# Patient Record
(demographics unavailable — no encounter records)

---

## 2024-12-20 NOTE — REVIEW OF SYSTEMS
[Constipation: Grade 0] : Constipation: Grade 0 [Diarrhea: Grade 0] : Diarrhea: Grade 0 [Rectal Pain: Grade 0] : Rectal Pain: Grade 0 [Hematuria: Grade 0] : Hematuria: Grade 0 [Urinary Incontinence: Grade 1 - Occasional (e.g., with coughing, sneezing, etc.), pads not indicated] : Urinary Incontinence: Grade 1 - Occasional (e.g., with coughing, sneezing, etc.), pads not indicated [Urinary Retention: Grade 1 - Urinary, suprapubic or intermittent catheter placement not indicated; able to void with some residual] : Urinary Retention: Grade 1 - Urinary, suprapubic or intermittent catheter placement not indicated; able to void with some residual [Urinary Tract Pain: Grade 0] : Urinary Tract Pain: Grade 0 [Urinary Urgency: Grade 1 - Present] : Urinary Urgency: Grade 1 - Present [Urinary Frequency: Grade 1 - Present] : Urinary Frequency: Grade 1 - Present [Erectile Dysfunction: Grade 1 - Decrease in erectile function (frequency or rigidity of erections) but intervention not indicated (e.g., medication or use of mechanical device, penile pump)] : Erectile Dysfunction: Grade 1 - Decrease in erectile function (frequency or rigidity of erections) but intervention not indicated (e.g., medication or use of mechanical device, penile pump) [Urinary Retention: Grade 0] : Urinary Retention: Grade 0 [Erectile Dysfunction: Grade 2 - Decrease in erectile function (frequency/rigidity of erections), erectile intervention indicated, (e.g., medication or mechanical devices such as penile pump)] : Erectile Dysfunction: Grade 2 - Decrease in erectile function (frequency/rigidity of erections), erectile intervention indicated, (e.g., medication or mechanical devices such as penile pump)

## 2024-12-20 NOTE — PHYSICAL EXAM
[Normal] : well developed, well nourished, in no acute distress [] : no respiratory distress [Respiration, Rhythm And Depth] : normal respiratory rhythm and effort [No Focal Deficits] : no focal deficits [Oriented To Time, Place, And Person] : oriented to person, place, and time [Nondistended] : nondistended

## 2024-12-22 NOTE — HISTORY OF PRESENT ILLNESS
[FreeTextEntry1] : Mr. Carrillo is a 72-year-old male with cT1c G7(4+3) iPSA 4.41 (1/3/22) unfavorable intermediate risk prostate cancer.  Decipher 0.91. ADT started 5/17/22 with 6 months total planned. Baseline IPSS score: 30. EPIC-CP score: 17; median lobectomy performed 6/24/22 (6 cc benign tissue). The patient underwent a course of radiation therapy 7,000 cGy in 28 fx from 9/12/2022 to 10/19/2022.   PTE - 12/01/2022 Notes weak stream, incomplete emptying, nocturia x 3 and urgency since decreasing doxazosin to 2 mg daily.   . Hot flashes bothersome, black cohosh did not work.  Denies dysuria, hematuria or bowel symptoms. Taking doxazosin 1 tab at night and solifenacin. Not sexually active as erections only suitable for masturbation. Tried Viagra and Cialis in the past without success.  Will discuss other treatment options with Dr. Azar.  IPSS/QOL/EPIC 23/5/18   Followup - 03/10/2023 03/01/2023 PSA -0.03 ng/ml. He continues to take Doxazosin (2mg BID) & Vesicare. Reports persistent hesitancy, nocturia x 2-3, urgency, occasional dribbling.  EPCIC/QOL/IPSS- 32/5/29.  Followup 6/20/23:  Patient continues with a weak stream, urinary frequency and nocturia, dribbling continues, uses one pad per day. denies hematuria, pain with urination.  Notes that his urinary symptoms are overall better and manageable.  Continued concerns with sexual function. Gynecomastia, but no breast tenderness, and hot flashes continue. Weight has returned to baseline.  Taking doxazosin 4 mg BID, continues with Solifenacin 5mg daily.  EPCIC/QOL/IPSS -  29/4/21  Follow up 12/15/23: Patient continues to have complaints in regard to LUTS. He states that he has started training his bladder at home which has helped to control the frequency a bit, however he continues to struggle with incomplete emptying, weak stream, urgency and associated leakage if he is unable to make it to the restroom in time. Presses on his abdomen to help with emptying.  Continues AZO, doxazosin 4 mg BID, and Solifenacin 5mg daily. The patient also complains of ED, mentioning that he is able to get an erection but is unable to maintain for a meaningful period of time. He expressed a desire for medication to aid in this issue. No bowel issues.  IPSS/QOL/EPIC score- 23/5/27. Testosterone is recovered and PSA remains low, excellent response. Persistent obstructive symptoms: continue doxazosin and will add tadalafil. This will be a persistent issue as he had significant obstructive issues at baseline requiring a median lobectomy; function is still better than pre-treatment at least. Will recheck PSA again in 6 months and follow up; continue to follow with Dr. Azar as well (seeing on 12/19/23).   6/21/24 - presents for follow up visit.  He continues to experience incomplete bladder emptying with weak stream and interruption of stream.  He no longer using tamsulosin due to lack of improvement as well as making him dizzy.  He in in the midst of looking for a new urologist (Dr Soni at [facility] [place] awaiting appt referred to him by Dr Brantley at [facility]) IPSS/QOL/EPIC score: 27/5/30  PSA 6/3/24 0.16  PSA trend:  iPSA 4.41 (1/3/22), baseline testosterone 306 on 12/10/21 -> ADT started 5/17/22 (6 mo) 0.39 9/12/22 -> RT complete 10/19/22  0.04 12/20/22, testosterone <2.5 0.03 3/1/22, testosterone 39.7  0.11 6/12/23, testosterone 295 0.239/12/23, testosterone 323  0.16 12/7/23, testosterone 342  0.18 3/12/24  0.16 6/3/24 0.11 12/16/24, Testosterone 486  6/2024: Bladder neck sx at [facility] with Dr. Tr Wick (per patient)  12/20/2024: Follow up Reports much improvement in urinary symptoms post sx. ED not responsive to Tadalafil. Now following uro Dr. Brantley( [place]) IPSS/QOL/EPIC score:11/5/18

## 2024-12-22 NOTE — HISTORY OF PRESENT ILLNESS
[FreeTextEntry1] : Mr. Carrillo is a 72-year-old male with cT1c G7(4+3) iPSA 4.41 (1/3/22) unfavorable intermediate risk prostate cancer.  Decipher 0.91. ADT started 5/17/22 with 6 months total planned. Baseline IPSS score: 30. EPIC-CP score: 17; median lobectomy performed 6/24/22 (6 cc benign tissue). The patient underwent a course of radiation therapy 7,000 cGy in 28 fx from 9/12/2022 to 10/19/2022.   PTE - 12/01/2022 Notes weak stream, incomplete emptying, nocturia x 3 and urgency since decreasing doxazosin to 2 mg daily.   . Hot flashes bothersome, black cohosh did not work.  Denies dysuria, hematuria or bowel symptoms. Taking doxazosin 1 tab at night and solifenacin. Not sexually active as erections only suitable for masturbation. Tried Viagra and Cialis in the past without success.  Will discuss other treatment options with Dr. Azar.  IPSS/QOL/EPIC 23/5/18   Followup - 03/10/2023 03/01/2023 PSA -0.03 ng/ml. He continues to take Doxazosin (2mg BID) & Vesicare. Reports persistent hesitancy, nocturia x 2-3, urgency, occasional dribbling.  EPCIC/QOL/IPSS- 32/5/29.  Followup 6/20/23:  Patient continues with a weak stream, urinary frequency and nocturia, dribbling continues, uses one pad per day. denies hematuria, pain with urination.  Notes that his urinary symptoms are overall better and manageable.  Continued concerns with sexual function. Gynecomastia, but no breast tenderness, and hot flashes continue. Weight has returned to baseline.  Taking doxazosin 4 mg BID, continues with Solifenacin 5mg daily.  EPCIC/QOL/IPSS -  29/4/21  Follow up 12/15/23: Patient continues to have complaints in regard to LUTS. He states that he has started training his bladder at home which has helped to control the frequency a bit, however he continues to struggle with incomplete emptying, weak stream, urgency and associated leakage if he is unable to make it to the restroom in time. Presses on his abdomen to help with emptying.  Continues AZO, doxazosin 4 mg BID, and Solifenacin 5mg daily. The patient also complains of ED, mentioning that he is able to get an erection but is unable to maintain for a meaningful period of time. He expressed a desire for medication to aid in this issue. No bowel issues.  IPSS/QOL/EPIC score- 23/5/27. Testosterone is recovered and PSA remains low, excellent response. Persistent obstructive symptoms: continue doxazosin and will add tadalafil. This will be a persistent issue as he had significant obstructive issues at baseline requiring a median lobectomy; function is still better than pre-treatment at least. Will recheck PSA again in 6 months and follow up; continue to follow with Dr. Azar as well (seeing on 12/19/23).   6/21/24 - presents for follow up visit.  He continues to experience incomplete bladder emptying with weak stream and interruption of stream.  He no longer using tamsulosin due to lack of improvement as well as making him dizzy.  He in in the midst of looking for a new urologist (Dr Soni at [facility] [place] awaiting appt referred to him by Dr Brantley at [facility]) IPSS/QOL/EPIC score: 27/5/30  PSA 6/3/24 0.16  PSA trend:  iPSA 4.41 (1/3/22), baseline testosterone 306 on 12/10/21 -> ADT started 5/17/22 (6 mo) 0.39 9/12/22 -> RT complete 10/19/22  0.04 12/20/22, testosterone <2.5 0.03 3/1/22, testosterone 39.7  0.11 6/12/23, testosterone 295 0.239/12/23, testosterone 323  0.16 12/7/23, testosterone 342  0.18 3/12/24  0.16 6/3/24 0.11 12/16/24, Testosterone 486  6/2024: Bladder neck sx at [facility] [place] with Dr. Tr Wick (per patient)  12/20/2024: Follow up Reports much improvement in urinary symptoms post sx. ED not responsive to Tadalafil. Now following uro Dr. Brantley( [place]) IPSS/QOL/EPIC score:11/5/18

## 2024-12-22 NOTE — DISEASE MANAGEMENT
[1] : T1 [c] : c [0] : M0 [0-10] : 0 -10 ng/mL [Biopsy with Fusion] : Patient had a biopsy with fusion on [7(4+3)] : Fusion Biopsy [place] Score: 7(4+3) [] : Patient had a Prostate MRI [4] : 4 [IIC] : IIC [Radiation Therapy] : Radiation Therapy [Treatment with radiation therapy] : Treatment with radiation therapy [EBRT] : EBRT [Treatment with androgen ablation] : Treatment with androgen ablation [Clinical] : TNM Stage: c [BiopsyDate] : 11/21 [MeasuredProstateVolume] : 33 [TotalCores] : 14 [TotalPositiveCores] : 8 [MaxCoreInvolvement] : 90 [LDR] : No LDR [HDR] : No HDR [RadiationCompletedDate] : 10/19/22 [EBRTDose] : 9242 [EBRTFractions] : 28 [ADTStartedDate] : 5/17/22 [ADTDuration] : 6 mo [ADTCompletedDate] : 11/17/22 [TTNM] : 1c [NTNM] : 0 [MTNM] : 0

## 2024-12-22 NOTE — DISEASE MANAGEMENT
[1] : T1 [c] : c [0] : M0 [0-10] : 0 -10 ng/mL [Biopsy with Fusion] : Patient had a biopsy with fusion on [7(4+3)] : Fusion Biopsy [place] Score: 7(4+3) [] : Patient had a Prostate MRI [4] : 4 [IIC] : IIC [Radiation Therapy] : Radiation Therapy [Treatment with radiation therapy] : Treatment with radiation therapy [EBRT] : EBRT [Treatment with androgen ablation] : Treatment with androgen ablation [Clinical] : TNM Stage: c [BiopsyDate] : 11/21 [MeasuredProstateVolume] : 33 [TotalCores] : 14 [TotalPositiveCores] : 8 [MaxCoreInvolvement] : 90 [LDR] : No LDR [HDR] : No HDR [RadiationCompletedDate] : 10/19/22 [EBRTFractions] : 28 [EBRTDose] : 9051 [ADTStartedDate] : 5/17/22 [ADTDuration] : 6 mo [ADTCompletedDate] : 11/17/22 [TTNM] : 1c [NTNM] : 0 [MTNM] : 0

## 2024-12-22 NOTE — HISTORY OF PRESENT ILLNESS
[FreeTextEntry1] : Mr. Carrillo is a 72-year-old male with cT1c G7(4+3) iPSA 4.41 (1/3/22) unfavorable intermediate risk prostate cancer.  Decipher 0.91. ADT started 5/17/22 with 6 months total planned. Baseline IPSS score: 30. EPIC-CP score: 17; median lobectomy performed 6/24/22 (6 cc benign tissue). The patient underwent a course of radiation therapy 7,000 cGy in 28 fx from 9/12/2022 to 10/19/2022.   PTE - 12/01/2022 Notes weak stream, incomplete emptying, nocturia x 3 and urgency since decreasing doxazosin to 2 mg daily.   . Hot flashes bothersome, black cohosh did not work.  Denies dysuria, hematuria or bowel symptoms. Taking doxazosin 1 tab at night and solifenacin. Not sexually active as erections only suitable for masturbation. Tried Viagra and Cialis in the past without success.  Will discuss other treatment options with Dr. Azar.  IPSS/QOL/EPIC 23/5/18   Followup - 03/10/2023 03/01/2023 PSA -0.03 ng/ml. He continues to take Doxazosin (2mg BID) & Vesicare. Reports persistent hesitancy, nocturia x 2-3, urgency, occasional dribbling.  EPCIC/QOL/IPSS- 32/5/29.  Followup 6/20/23:  Patient continues with a weak stream, urinary frequency and nocturia, dribbling continues, uses one pad per day. denies hematuria, pain with urination.  Notes that his urinary symptoms are overall better and manageable.  Continued concerns with sexual function. Gynecomastia, but no breast tenderness, and hot flashes continue. Weight has returned to baseline.  Taking doxazosin 4 mg BID, continues with Solifenacin 5mg daily.  EPCIC/QOL/IPSS -  29/4/21  Follow up 12/15/23: Patient continues to have complaints in regard to LUTS. He states that he has started training his bladder at home which has helped to control the frequency a bit, however he continues to struggle with incomplete emptying, weak stream, urgency and associated leakage if he is unable to make it to the restroom in time. Presses on his abdomen to help with emptying.  Continues AZO, doxazosin 4 mg BID, and Solifenacin 5mg daily. The patient also complains of ED, mentioning that he is able to get an erection but is unable to maintain for a meaningful period of time. He expressed a desire for medication to aid in this issue. No bowel issues.  IPSS/QOL/EPIC score- 23/5/27. Testosterone is recovered and PSA remains low, excellent response. Persistent obstructive symptoms: continue doxazosin and will add tadalafil. This will be a persistent issue as he had significant obstructive issues at baseline requiring a median lobectomy; function is still better than pre-treatment at least. Will recheck PSA again in 6 months and follow up; continue to follow with Dr. Aazr as well (seeing on 12/19/23).   6/21/24 - presents for follow up visit.  He continues to experience incomplete bladder emptying with weak stream and interruption of stream.  He no longer using tamsulosin due to lack of improvement as well as making him dizzy.  He in in the midst of looking for a new urologist [place] [facility] [place] him by Dr Brant[facility] at [facility]) IPSS/QOL/EPIC score: 27/5/30  PSA 6/3/24 0.16  PSA trend:  iPSA 4.41 (1/3/22), baseline testosterone 306 on 12/10/21 -> ADT started 5/17/22 (6 mo) 0.39 9/12/22 -> RT complete 10/19/22  0.04 12/20/22, testosterone <2.5 0.03 3/1/22, testosterone 39.7  0.11 6/12/23, testosterone 295 0.239/12/23, testosterone 323  0.16 12/7/23, testosterone 342  0.18 3/12/24  0.16 6/3/24 0.11 12/16/24, Testosterone 486  6/2024: [place] [facility] [place] (per patient)  12/20/2024: Follow up Reports much improvement in urinary symptoms post sx. ED not responsive to Tadalafil. Now following uro Dr. Brantley( [place]) IPSS/QOL/EPIC score:11/5/18

## 2024-12-22 NOTE — DISEASE MANAGEMENT
[1] : T1 [c] : c [0] : M0 [0-10] : 0 -10 ng/mL [Biopsy with Fusion] : Patient had a biopsy with fusion on [7(4+3)] : Fusion Biopsy [place] Score: 7(4+3) [] : Patient had a Prostate MRI [4] : 4 [IIC] : IIC [Radiation Therapy] : Radiation Therapy [Treatment with radiation therapy] : Treatment with radiation therapy [EBRT] : EBRT [Treatment with androgen ablation] : Treatment with androgen ablation [Clinical] : TNM Stage: c [BiopsyDate] : 11/21 [MeasuredProstateVolume] : 33 [TotalCores] : 14 [TotalPositiveCores] : 8 [MaxCoreInvolvement] : 90 [LDR] : No LDR [HDR] : No HDR [RadiationCompletedDate] : 10/19/22 [EBRTDose] : [id number] [EBRTFractions] : 28 [ADTStartedDate] : 5/17/22 [ADTDuration] : 6 mo [ADTCompletedDate] : 11/17/22 [TTNM] : 1c [NTNM] : 0 [MTNM] : 0

## 2025-03-05 NOTE — ASSESSMENT
[FreeTextEntry1] : ======================= HTN  Rafael 1  Remote episode of syncope, ?related to BP meds  Prostate Ca.

## 2025-03-05 NOTE — DISCUSSION/SUMMARY
[EKG obtained to assist in diagnosis and management of assessed problem(s)] : EKG obtained to assist in diagnosis and management of assessed problem(s) [FreeTextEntry1] : EKG today shows:   Sinus Rhythm at 81 bpm.  Mild first degree A-V block, otherwise unremarkable; no significant change.   PLAN: 1.   HTN - BP remains in normal range; readings also good at home.  Mild hypertensive changes on most recent TTE. -   continue 30 mg of nifedipine XL each day.  -   Currently takes doxazosin 2 or 3 times weekly.   -  - Continue low salt diet.  2.  Mobitz 1 conduction - not seen on EKG today.  On most recent ZIO monitor, occasional episodes of Mobitz 1 were seen but were not a cause for concern.  .    3.  Syncope, likely related to meds, since adjusted - no recurrence  4.  Will send out blood work today.  He requests that we include a PSA, testosterone level as well as anemia labs and vit. D.  32 minutes spent on today's office visit.   The patient will return for a visit in 6 months.

## 2025-03-05 NOTE — HISTORY OF PRESENT ILLNESS
[FreeTextEntry1] : Mr. Carrillo has a history of HTN.  In 2009, he apparently suffered a TIA with expressive aphasia which resolved within 5 minutes; he remained well thereafter. Since that episode, he has consistently taken blood pressure medications.  9/17/24: He was hospitalized at [facility] in [facility] in June for a TURP.  He was advised that an episode of second-degree AV block was seen on his EKG.   and it was recommended that he come here for a follow-up visit.  An event monitor in June showed SR with occasional Mobitz 1 conduction; significant abnormalities were seen. As he returns today, he remains from a cardiac standpoint & reports no cardiac symptoms.  There have been no episodes of exertional chest discomfort or DOUGHERTY.  He describes no palpitations or episodes of lightheadedness.  He reports no orthopnea or PND.  There has been no recurrence of syncope.  3/5/25: Since I saw him last he remains active and well.  He reports no episodes of exertional chest discomfort or DOUGHERTY. There have been no palpitations or  episodes of lightheadedness/LOC. There have been no episodes of orthopnea or PND.  Medications are unchanged.

## 2025-03-05 NOTE — PHYSICAL EXAM
[General Appearance - Well Developed] : well developed [Normal Appearance] : normal appearance [Well Groomed] : well groomed [General Appearance - Well Nourished] : well nourished [General Appearance - In No Acute Distress] : no acute distress [Normal Conjunctiva] : the conjunctiva exhibited no abnormalities [Eyelids - No Xanthelasma] : the eyelids demonstrated no xanthelasmas [Normal Jugular Venous A Waves Present] : normal jugular venous A waves present [Normal Jugular Venous V Waves Present] : normal jugular venous V waves present [No Jugular Venous Beltran A Waves] : no jugular venous beltran A waves [Respiration, Rhythm And Depth] : normal respiratory rhythm and effort [Auscultation Breath Sounds / Voice Sounds] : lungs were clear to auscultation bilaterally [Heart Rate And Rhythm] : heart rate and rhythm were normal [Bowel Sounds] : normal bowel sounds [Abnormal Walk] : normal gait [Gait - Sufficient For Exercise Testing] : the gait was sufficient for exercise testing [Nail Clubbing] : no clubbing of the fingernails [Cyanosis, Localized] : no localized cyanosis [Skin Color & Pigmentation] : normal skin color and pigmentation [] : no rash [Oriented To Time, Place, And Person] : oriented to person, place, and time [Impaired Insight] : insight and judgment were intact [Affect] : the affect was normal [Mood] : the mood was normal [FreeTextEntry1] : Mild varicosities.  2+ pulses in upper and lower extremities.

## 2025-03-05 NOTE — REASON FOR VISIT
[FreeTextEntry1] :   Roberto Carrillo returns for f/u regarding HTN and an abnormality noted on EKG.

## 2025-06-27 NOTE — PHYSICAL EXAM
[] : no respiratory distress [Nondistended] : nondistended [No Focal Deficits] : no focal deficits [Oriented To Time, Place, And Person] : oriented to person, place, and time [Normal] : normal heart rate and rhythm, normal S1 and S2, and no murmurs present

## 2025-06-27 NOTE — REVIEW OF SYSTEMS
[Constipation: Grade 0] : Constipation: Grade 0 [Diarrhea: Grade 0] : Diarrhea: Grade 0 [Rectal Pain: Grade 0] : Rectal Pain: Grade 0 [Hematuria: Grade 0] : Hematuria: Grade 0 [Urinary Retention: Grade 0] : Urinary Retention: Grade 0 [Urinary Tract Pain: Grade 0] : Urinary Tract Pain: Grade 0 [Urinary Urgency: Grade 1 - Present] : Urinary Urgency: Grade 1 - Present [Urinary Frequency: Grade 1 - Present] : Urinary Frequency: Grade 1 - Present [Erectile Dysfunction: Grade 2 - Decrease in erectile function (frequency/rigidity of erections), erectile intervention indicated, (e.g., medication or mechanical devices such as penile pump)] : Erectile Dysfunction: Grade 2 - Decrease in erectile function (frequency/rigidity of erections), erectile intervention indicated, (e.g., medication or mechanical devices such as penile pump) [Urinary Incontinence: Grade 0] : Urinary Incontinence: Grade 0

## 2025-06-27 NOTE — DISEASE MANAGEMENT
[1] : T1 [c] : c [0] : M0 [0-10] : 0 -10 ng/mL [Biopsy with Fusion] : Patient had a biopsy with fusion on [7(4+3)] : Fusion Biopsy [place] Score: 7(4+3) [] : Patient had a Prostate MRI [4] : 4 [IIC] : IIC [Radiation Therapy] : Radiation Therapy [Treatment with radiation therapy] : Treatment with radiation therapy [EBRT] : EBRT [Treatment with androgen ablation] : Treatment with androgen ablation [Clinical] : TNM Stage: c [BiopsyDate] : 11/21 [MeasuredProstateVolume] : 33 [TotalCores] : 14 [TotalPositiveCores] : 8 [MaxCoreInvolvement] : 90 [LDR] : No LDR [HDR] : No HDR [RadiationCompletedDate] : 10/19/22 [EBRTDose] : [id number] [EBRTFractions] : 28 [ADTStartedDate] : 5/17/22 [ADTDuration] : 6 mo [ADTCompletedDate] : 11/17/22 [TTNM] : 1c [NTNM] : 0 [MTNM] : 0

## 2025-06-27 NOTE — HISTORY OF PRESENT ILLNESS
[FreeTextEntry1] : Mr. Carrillo is a 72-year-old male with cT1c G7(4+3) iPSA 4.41 (1/3/22) unfavorable intermediate risk prostate cancer.  Decipher 0.91. ADT started 5/17/22 with 6 months total planned. Baseline IPSS score: 30. EPIC-CP score: 17; median lobectomy performed 6/24/22 (6 cc benign tissue). The patient underwent a course of radiation therapy 7,000 cGy in 28 fx from 9/12/2022 to 10/19/2022.   PTE - 12/01/2022 Notes weak stream, incomplete emptying, nocturia x 3 and urgency since decreasing doxazosin to 2 mg daily.   . Hot flashes bothersome, black cohosh did not work.  Denies dysuria, hematuria or bowel symptoms. Taking doxazosin 1 tab at night and solifenacin. Not sexually active as erections only suitable for masturbation. Tried Viagra and Cialis in the past without success.  Will discuss other treatment options with Dr. Azar.  IPSS/QOL/EPIC 23/5/18   Followup - 03/10/2023 03/01/2023 PSA -0.03 ng/ml. He continues to take Doxazosin (2mg BID) & Vesicare. Reports persistent hesitancy, nocturia x 2-3, urgency, occasional dribbling.  EPCIC/QOL/IPSS- 32/5/29.  Followup 6/20/23:  Patient continues with a weak stream, urinary frequency and nocturia, dribbling continues, uses one pad per day. denies hematuria, pain with urination.  Notes that his urinary symptoms are overall better and manageable.  Continued concerns with sexual function. Gynecomastia, but no breast tenderness, and hot flashes continue. Weight has returned to baseline.  Taking doxazosin 4 mg BID, continues with Solifenacin 5mg daily.  EPCIC/QOL/IPSS -  29/4/21  Follow up 12/15/23: Patient continues to have complaints in regard to LUTS. He states that he has started training his bladder at home which has helped to control the frequency a bit, however he continues to struggle with incomplete emptying, weak stream, urgency and associated leakage if he is unable to make it to the restroom in time. Presses on his abdomen to help with emptying.  Continues AZO, doxazosin 4 mg BID, and Solifenacin 5mg daily. The patient also complains of ED, mentioning that he is able to get an erection but is unable to maintain for a meaningful period of time. He expressed a desire for medication to aid in this issue. No bowel issues.  IPSS/QOL/EPIC score- 23/5/27. Testosterone is recovered and PSA remains low, excellent response. Persistent obstructive symptoms: continue doxazosin and will add tadalafil. This will be a persistent issue as he had significant obstructive issues at baseline requiring a median lobectomy; function is still better than pre-treatment at least. Will recheck PSA again in 6 months and follow up; continue to follow with Dr. Azar as well (seeing on 12/19/23).   Follow Up 6/21/24 - presents for follow up visit.  He continues to experience incomplete bladder emptying with weak stream and interruption of stream.  He no longer using tamsulosin due to lack of improvement as well as making him dizzy.  He in in the midst of looking for a new urologist (Dr Soni at [facility] [place] awaiting appt referred to him by Dr Brantley at [facility]) IPSS/QOL/EPIC score: 27/5/30  PSA 6/3/24 0.16  PSA trend:  iPSA 4.41 (1/3/22), baseline testosterone 306 on 12/10/21 -> ADT started 5/17/22 (6 mo) 0.39 9/12/22 -> RT complete 10/19/22  0.04 12/20/22, testosterone <2.5 0.03 3/1/22, testosterone 39.7  0.11 6/12/23, testosterone 295 0.239/12/23, testosterone 323  0.16 12/7/23, testosterone 342  0.18 3/12/24  0.16 6/3/24 0.11 12/16/24, Testosterone 486 0.07 ng/ml; 06/20/2025 6/2024: Bladder neck sx at [facility] [place] with Dr. Tr Wick (per patient)  Follow Up 12/20/2024:  Reports much improvement in urinary symptoms post sx. ED not responsive to Tadalafil. Now following uro Dr. Brantley( [place]) IPSS/QOL/EPIC score:11/5/18  Follow Up: 6/27/2025 Reports improvement in urinary symptoms post bladder Sx, but now at baseline continues to have urgency & frequency. IPSS/QOL/EPIC score: 13/5/25